# Patient Record
Sex: MALE | Race: WHITE | NOT HISPANIC OR LATINO | Employment: OTHER | URBAN - NONMETROPOLITAN AREA
[De-identification: names, ages, dates, MRNs, and addresses within clinical notes are randomized per-mention and may not be internally consistent; named-entity substitution may affect disease eponyms.]

---

## 2021-09-13 ENCOUNTER — APPOINTMENT (OUTPATIENT)
Dept: CT IMAGING | Facility: HOSPITAL | Age: 61
End: 2021-09-13

## 2021-09-13 ENCOUNTER — HOSPITAL ENCOUNTER (OUTPATIENT)
Facility: HOSPITAL | Age: 61
Setting detail: OBSERVATION
Discharge: HOME | End: 2021-09-15
Attending: EMERGENCY MEDICINE | Admitting: INTERNAL MEDICINE

## 2021-09-13 DIAGNOSIS — I25.10 ATHEROSCLEROSIS OF NATIVE CORONARY ARTERY OF NATIVE HEART WITHOUT ANGINA PECTORIS: ICD-10-CM

## 2021-09-13 DIAGNOSIS — R94.31 ABNORMAL EKG: ICD-10-CM

## 2021-09-13 DIAGNOSIS — R07.9 CHEST PAIN, UNSPECIFIED TYPE: Primary | ICD-10-CM

## 2021-09-13 DIAGNOSIS — I25.810 ATHEROSCLEROSIS OF AUTOLOGOUS VEIN CORONARY ARTERY BYPASS GRAFT, ANGINA PRESENCE UNSPECIFIED: ICD-10-CM

## 2021-09-13 LAB
BASOPHILS # BLD AUTO: 0.04 10*3/MM3 (ref 0–0.2)
BASOPHILS NFR BLD AUTO: 0.5 % (ref 0–1.5)
DEPRECATED RDW RBC AUTO: 38.8 FL (ref 37–54)
EOSINOPHIL # BLD AUTO: 0.31 10*3/MM3 (ref 0–0.4)
EOSINOPHIL NFR BLD AUTO: 4.1 % (ref 0.3–6.2)
ERYTHROCYTE [DISTWIDTH] IN BLOOD BY AUTOMATED COUNT: 13.1 % (ref 12.3–15.4)
HCT VFR BLD AUTO: 43.2 % (ref 37.5–51)
HGB BLD-MCNC: 14.6 G/DL (ref 13–17.7)
IMM GRANULOCYTES # BLD AUTO: 0.04 10*3/MM3 (ref 0–0.05)
IMM GRANULOCYTES NFR BLD AUTO: 0.5 % (ref 0–0.5)
LYMPHOCYTES # BLD AUTO: 2.63 10*3/MM3 (ref 0.7–3.1)
LYMPHOCYTES NFR BLD AUTO: 34.8 % (ref 19.6–45.3)
MCH RBC QN AUTO: 28 PG (ref 26.6–33)
MCHC RBC AUTO-ENTMCNC: 33.8 G/DL (ref 31.5–35.7)
MCV RBC AUTO: 82.9 FL (ref 79–97)
MONOCYTES # BLD AUTO: 0.68 10*3/MM3 (ref 0.1–0.9)
MONOCYTES NFR BLD AUTO: 9 % (ref 5–12)
NEUTROPHILS NFR BLD AUTO: 3.85 10*3/MM3 (ref 1.7–7)
NEUTROPHILS NFR BLD AUTO: 51.1 % (ref 42.7–76)
NRBC BLD AUTO-RTO: 0 /100 WBC (ref 0–0.2)
PLATELET # BLD AUTO: 216 10*3/MM3 (ref 140–450)
PMV BLD AUTO: 10.4 FL (ref 6–12)
RBC # BLD AUTO: 5.21 10*6/MM3 (ref 4.14–5.8)
WBC # BLD AUTO: 7.55 10*3/MM3 (ref 3.4–10.8)

## 2021-09-13 PROCEDURE — 96360 HYDRATION IV INFUSION INIT: CPT

## 2021-09-13 PROCEDURE — 80053 COMPREHEN METABOLIC PANEL: CPT | Performed by: EMERGENCY MEDICINE

## 2021-09-13 PROCEDURE — 93005 ELECTROCARDIOGRAM TRACING: CPT | Performed by: EMERGENCY MEDICINE

## 2021-09-13 PROCEDURE — 99285 EMERGENCY DEPT VISIT HI MDM: CPT

## 2021-09-13 PROCEDURE — 83690 ASSAY OF LIPASE: CPT | Performed by: EMERGENCY MEDICINE

## 2021-09-13 PROCEDURE — 85025 COMPLETE CBC W/AUTO DIFF WBC: CPT | Performed by: EMERGENCY MEDICINE

## 2021-09-13 PROCEDURE — 93005 ELECTROCARDIOGRAM TRACING: CPT

## 2021-09-13 PROCEDURE — 84484 ASSAY OF TROPONIN QUANT: CPT | Performed by: EMERGENCY MEDICINE

## 2021-09-13 PROCEDURE — 93010 ELECTROCARDIOGRAM REPORT: CPT | Performed by: INTERNAL MEDICINE

## 2021-09-13 PROCEDURE — 99284 EMERGENCY DEPT VISIT MOD MDM: CPT

## 2021-09-13 RX ORDER — SODIUM CHLORIDE 0.9 % (FLUSH) 0.9 %
10 SYRINGE (ML) INJECTION AS NEEDED
Status: DISCONTINUED | OUTPATIENT
Start: 2021-09-13 | End: 2021-09-15 | Stop reason: HOSPADM

## 2021-09-13 RX ADMIN — SODIUM CHLORIDE 1000 ML: 900 INJECTION, SOLUTION INTRAVENOUS at 23:18

## 2021-09-13 RX ADMIN — NITROGLYCERIN 1 INCH: 20 OINTMENT TOPICAL at 23:18

## 2021-09-14 ENCOUNTER — APPOINTMENT (OUTPATIENT)
Dept: CARDIOLOGY | Facility: HOSPITAL | Age: 61
End: 2021-09-14

## 2021-09-14 ENCOUNTER — APPOINTMENT (OUTPATIENT)
Dept: NUCLEAR MEDICINE | Facility: HOSPITAL | Age: 61
End: 2021-09-14

## 2021-09-14 ENCOUNTER — APPOINTMENT (OUTPATIENT)
Dept: CT IMAGING | Facility: HOSPITAL | Age: 61
End: 2021-09-14

## 2021-09-14 PROBLEM — R07.9 CHEST PAIN: Status: ACTIVE | Noted: 2021-09-14

## 2021-09-14 LAB
ALBUMIN SERPL-MCNC: 4.3 G/DL (ref 3.5–5.2)
ALBUMIN/GLOB SERPL: 1.8 G/DL
ALP SERPL-CCNC: 86 U/L (ref 39–117)
ALT SERPL W P-5'-P-CCNC: 14 U/L (ref 1–41)
ANION GAP SERPL CALCULATED.3IONS-SCNC: 10 MMOL/L (ref 5–15)
ANION GAP SERPL CALCULATED.3IONS-SCNC: 11 MMOL/L (ref 5–15)
AST SERPL-CCNC: 13 U/L (ref 1–40)
BH CV ECHO MEAS - AI DEC SLOPE: 235 CM/SEC^2
BH CV ECHO MEAS - AI MAX PG: 76.7 MMHG
BH CV ECHO MEAS - AI MAX VEL: 438 CM/SEC
BH CV ECHO MEAS - AI P1/2T: 545.9 MSEC
BH CV ECHO MEAS - AO MAX PG (FULL): 6.5 MMHG
BH CV ECHO MEAS - AO MAX PG: 14.4 MMHG
BH CV ECHO MEAS - AO MEAN PG (FULL): 4 MMHG
BH CV ECHO MEAS - AO MEAN PG: 8 MMHG
BH CV ECHO MEAS - AO V2 MAX: 190 CM/SEC
BH CV ECHO MEAS - AO V2 MEAN: 131 CM/SEC
BH CV ECHO MEAS - AO V2 VTI: 43.9 CM
BH CV ECHO MEAS - AVA(I,A): 2.9 CM^2
BH CV ECHO MEAS - AVA(I,D): 2.9 CM^2
BH CV ECHO MEAS - AVA(V,A): 2.8 CM^2
BH CV ECHO MEAS - AVA(V,D): 2.8 CM^2
BH CV ECHO MEAS - BSA(HAYCOCK): 2.2 M^2
BH CV ECHO MEAS - BSA: 2.1 M^2
BH CV ECHO MEAS - BZI_BMI: 29.4 KILOGRAMS/M^2
BH CV ECHO MEAS - BZI_METRIC_HEIGHT: 177.8 CM
BH CV ECHO MEAS - BZI_METRIC_WEIGHT: 93 KG
BH CV ECHO MEAS - EDV(CUBED): 85.2 ML
BH CV ECHO MEAS - EDV(MOD-SP2): 67.3 ML
BH CV ECHO MEAS - EDV(MOD-SP4): 62.1 ML
BH CV ECHO MEAS - EDV(TEICH): 87.7 ML
BH CV ECHO MEAS - EF(CUBED): 71.4 %
BH CV ECHO MEAS - EF(MOD-SP2): 61.7 %
BH CV ECHO MEAS - EF(MOD-SP4): 64.6 %
BH CV ECHO MEAS - EF(TEICH): 63.3 %
BH CV ECHO MEAS - ESV(CUBED): 24.4 ML
BH CV ECHO MEAS - ESV(MOD-SP2): 25.8 ML
BH CV ECHO MEAS - ESV(MOD-SP4): 22 ML
BH CV ECHO MEAS - ESV(TEICH): 32.2 ML
BH CV ECHO MEAS - FS: 34.1 %
BH CV ECHO MEAS - IVS/LVPW: 1.1
BH CV ECHO MEAS - IVSD: 1.1 CM
BH CV ECHO MEAS - LA DIMENSION: 3.6 CM
BH CV ECHO MEAS - LV DIASTOLIC VOL/BSA (35-75): 29.4 ML/M^2
BH CV ECHO MEAS - LV MASS(C)D: 163.5 GRAMS
BH CV ECHO MEAS - LV MASS(C)DI: 77.5 GRAMS/M^2
BH CV ECHO MEAS - LV MAX PG: 8 MMHG
BH CV ECHO MEAS - LV MEAN PG: 4 MMHG
BH CV ECHO MEAS - LV SYSTOLIC VOL/BSA (12-30): 10.4 ML/M^2
BH CV ECHO MEAS - LV V1 MAX: 141 CM/SEC
BH CV ECHO MEAS - LV V1 MEAN: 98.7 CM/SEC
BH CV ECHO MEAS - LV V1 VTI: 33.3 CM
BH CV ECHO MEAS - LVIDD: 4.4 CM
BH CV ECHO MEAS - LVIDS: 2.9 CM
BH CV ECHO MEAS - LVLD AP2: 7.4 CM
BH CV ECHO MEAS - LVLD AP4: 8.1 CM
BH CV ECHO MEAS - LVLS AP2: 6.3 CM
BH CV ECHO MEAS - LVLS AP4: 6.2 CM
BH CV ECHO MEAS - LVOT AREA (M): 3.8 CM^2
BH CV ECHO MEAS - LVOT AREA: 3.8 CM^2
BH CV ECHO MEAS - LVOT DIAM: 2.2 CM
BH CV ECHO MEAS - LVPWD: 1 CM
BH CV ECHO MEAS - MR MAX PG: 88.7 MMHG
BH CV ECHO MEAS - MR MAX VEL: 471 CM/SEC
BH CV ECHO MEAS - MV A MAX VEL: 75.8 CM/SEC
BH CV ECHO MEAS - MV DEC SLOPE: 502 CM/SEC^2
BH CV ECHO MEAS - MV E MAX VEL: 99.4 CM/SEC
BH CV ECHO MEAS - MV E/A: 1.3
BH CV ECHO MEAS - MV P1/2T MAX VEL: 103 CM/SEC
BH CV ECHO MEAS - MV P1/2T: 60.1 MSEC
BH CV ECHO MEAS - MVA P1/2T LCG: 2.1 CM^2
BH CV ECHO MEAS - MVA(P1/2T): 3.7 CM^2
BH CV ECHO MEAS - PA MAX PG (FULL): 5.2 MMHG
BH CV ECHO MEAS - PA MAX PG: 7.3 MMHG
BH CV ECHO MEAS - PA V2 MAX: 135 CM/SEC
BH CV ECHO MEAS - RAP SYSTOLE: 5 MMHG
BH CV ECHO MEAS - RV MAX PG: 2.1 MMHG
BH CV ECHO MEAS - RV MEAN PG: 1 MMHG
BH CV ECHO MEAS - RV V1 MAX: 73.1 CM/SEC
BH CV ECHO MEAS - RV V1 MEAN: 50.1 CM/SEC
BH CV ECHO MEAS - RV V1 VTI: 18.5 CM
BH CV ECHO MEAS - RVDD: 2.7 CM
BH CV ECHO MEAS - RVSP: 39.6 MMHG
BH CV ECHO MEAS - SI(CUBED): 28.8 ML/M^2
BH CV ECHO MEAS - SI(LVOT): 60 ML/M^2
BH CV ECHO MEAS - SI(MOD-SP2): 19.7 ML/M^2
BH CV ECHO MEAS - SI(MOD-SP4): 19 ML/M^2
BH CV ECHO MEAS - SI(TEICH): 26.3 ML/M^2
BH CV ECHO MEAS - SV(CUBED): 60.8 ML
BH CV ECHO MEAS - SV(LVOT): 126.6 ML
BH CV ECHO MEAS - SV(MOD-SP2): 41.5 ML
BH CV ECHO MEAS - SV(MOD-SP4): 40.1 ML
BH CV ECHO MEAS - SV(TEICH): 55.5 ML
BH CV ECHO MEAS - TR MAX VEL: 294 CM/SEC
BH CV REST NUCLEAR ISOTOPE DOSE: 10.8 MCI
BH CV STRESS BP STAGE 1: NORMAL
BH CV STRESS DURATION MIN STAGE 1: 3
BH CV STRESS DURATION SEC STAGE 1: 0
BH CV STRESS GRADE STAGE 1: 10
BH CV STRESS HR STAGE 1: 114
BH CV STRESS METS STAGE 1: 5
BH CV STRESS NUCLEAR ISOTOPE DOSE: 33.3 MCI
BH CV STRESS PROTOCOL 1: NORMAL
BH CV STRESS PROTOCOL 2 BP STAGE 1: NORMAL
BH CV STRESS PROTOCOL 2 COMMENTS STAGE 1: NORMAL
BH CV STRESS PROTOCOL 2 DOSE REGADENOSON STAGE 1: 0.4
BH CV STRESS PROTOCOL 2 DURATION MIN STAGE 1: 0
BH CV STRESS PROTOCOL 2 DURATION SEC STAGE 1: 10
BH CV STRESS PROTOCOL 2 HR STAGE 1: 101
BH CV STRESS PROTOCOL 2 STAGE 1: 1
BH CV STRESS PROTOCOL 2: NORMAL
BH CV STRESS RECOVERY BP: NORMAL MMHG
BH CV STRESS RECOVERY HR: 97 BPM
BH CV STRESS SPEED STAGE 1: 1.7
BH CV STRESS STAGE 1: 1
BILIRUB SERPL-MCNC: 0.3 MG/DL (ref 0–1.2)
BUN SERPL-MCNC: 12 MG/DL (ref 6–20)
BUN SERPL-MCNC: 12 MG/DL (ref 6–20)
BUN/CREAT SERPL: 15.2 (ref 7–25)
BUN/CREAT SERPL: 18.2 (ref 7–25)
CALCIUM SPEC-SCNC: 8.8 MG/DL (ref 8.6–10.5)
CALCIUM SPEC-SCNC: 9.6 MG/DL (ref 8.6–10.5)
CHLORIDE SERPL-SCNC: 106 MMOL/L (ref 98–107)
CHLORIDE SERPL-SCNC: 99 MMOL/L (ref 98–107)
CO2 SERPL-SCNC: 24 MMOL/L (ref 22–29)
CO2 SERPL-SCNC: 24 MMOL/L (ref 22–29)
CREAT SERPL-MCNC: 0.66 MG/DL (ref 0.76–1.27)
CREAT SERPL-MCNC: 0.79 MG/DL (ref 0.76–1.27)
DEPRECATED RDW RBC AUTO: 38.7 FL (ref 37–54)
ERYTHROCYTE [DISTWIDTH] IN BLOOD BY AUTOMATED COUNT: 13 % (ref 12.3–15.4)
FLUAV RNA RESP QL NAA+PROBE: NOT DETECTED
FLUBV RNA RESP QL NAA+PROBE: NOT DETECTED
GFR SERPL CREATININE-BSD FRML MDRD: 100 ML/MIN/1.73
GFR SERPL CREATININE-BSD FRML MDRD: 124 ML/MIN/1.73
GLOBULIN UR ELPH-MCNC: 2.4 GM/DL
GLUCOSE BLDC GLUCOMTR-MCNC: 201 MG/DL (ref 70–130)
GLUCOSE BLDC GLUCOMTR-MCNC: 238 MG/DL (ref 70–130)
GLUCOSE SERPL-MCNC: 152 MG/DL (ref 65–99)
GLUCOSE SERPL-MCNC: 200 MG/DL (ref 65–99)
HCT VFR BLD AUTO: 41.4 % (ref 37.5–51)
HGB BLD-MCNC: 14.3 G/DL (ref 13–17.7)
HOLD SPECIMEN: NORMAL
LIPASE SERPL-CCNC: 29 U/L (ref 13–60)
LV EF NUC BP: 77 %
MAXIMAL PREDICTED HEART RATE: 161 BPM
MAXIMAL PREDICTED HEART RATE: 161 BPM
MCH RBC QN AUTO: 28.5 PG (ref 26.6–33)
MCHC RBC AUTO-ENTMCNC: 34.5 G/DL (ref 31.5–35.7)
MCV RBC AUTO: 82.6 FL (ref 79–97)
PERCENT MAX PREDICTED HR: 78.26 %
PLATELET # BLD AUTO: 183 10*3/MM3 (ref 140–450)
PMV BLD AUTO: 9.7 FL (ref 6–12)
POTASSIUM SERPL-SCNC: 3.5 MMOL/L (ref 3.5–5.2)
POTASSIUM SERPL-SCNC: 3.7 MMOL/L (ref 3.5–5.2)
PROT SERPL-MCNC: 6.7 G/DL (ref 6–8.5)
RBC # BLD AUTO: 5.01 10*6/MM3 (ref 4.14–5.8)
SARS-COV-2 RNA RESP QL NAA+PROBE: NOT DETECTED
SODIUM SERPL-SCNC: 134 MMOL/L (ref 136–145)
SODIUM SERPL-SCNC: 140 MMOL/L (ref 136–145)
STRESS BASELINE BP: NORMAL MMHG
STRESS BASELINE HR: 58 BPM
STRESS PERCENT HR: 92 %
STRESS POST EXERCISE DUR MIN: 2 MIN
STRESS POST EXERCISE DUR SEC: 9 SEC
STRESS POST PEAK BP: NORMAL MMHG
STRESS POST PEAK HR: 126 BPM
STRESS TARGET HR: 137 BPM
STRESS TARGET HR: 137 BPM
TROPONIN T SERPL-MCNC: <0.01 NG/ML (ref 0–0.03)
WBC # BLD AUTO: 7.19 10*3/MM3 (ref 3.4–10.8)
WHOLE BLOOD HOLD SPECIMEN: NORMAL

## 2021-09-14 PROCEDURE — 87636 SARSCOV2 & INF A&B AMP PRB: CPT

## 2021-09-14 PROCEDURE — A9500 TC99M SESTAMIBI: HCPCS | Performed by: INTERNAL MEDICINE

## 2021-09-14 PROCEDURE — G0378 HOSPITAL OBSERVATION PER HR: HCPCS

## 2021-09-14 PROCEDURE — 84484 ASSAY OF TROPONIN QUANT: CPT | Performed by: EMERGENCY MEDICINE

## 2021-09-14 PROCEDURE — 93005 ELECTROCARDIOGRAM TRACING: CPT | Performed by: INTERNAL MEDICINE

## 2021-09-14 PROCEDURE — 93306 TTE W/DOPPLER COMPLETE: CPT

## 2021-09-14 PROCEDURE — 25010000002 REGADENOSON 0.4 MG/5ML SOLUTION: Performed by: INTERNAL MEDICINE

## 2021-09-14 PROCEDURE — 80048 BASIC METABOLIC PNL TOTAL CA: CPT | Performed by: INTERNAL MEDICINE

## 2021-09-14 PROCEDURE — 0 TECHNETIUM SESTAMIBI: Performed by: INTERNAL MEDICINE

## 2021-09-14 PROCEDURE — 85027 COMPLETE CBC AUTOMATED: CPT | Performed by: INTERNAL MEDICINE

## 2021-09-14 PROCEDURE — 71275 CT ANGIOGRAPHY CHEST: CPT

## 2021-09-14 PROCEDURE — 93010 ELECTROCARDIOGRAM REPORT: CPT | Performed by: INTERNAL MEDICINE

## 2021-09-14 PROCEDURE — 78452 HT MUSCLE IMAGE SPECT MULT: CPT

## 2021-09-14 PROCEDURE — 93017 CV STRESS TEST TRACING ONLY: CPT

## 2021-09-14 PROCEDURE — 96361 HYDRATE IV INFUSION ADD-ON: CPT

## 2021-09-14 PROCEDURE — 36415 COLL VENOUS BLD VENIPUNCTURE: CPT | Performed by: INTERNAL MEDICINE

## 2021-09-14 PROCEDURE — 84484 ASSAY OF TROPONIN QUANT: CPT | Performed by: INTERNAL MEDICINE

## 2021-09-14 PROCEDURE — 63710000001 INSULIN ASPART PER 5 UNITS: Performed by: NURSE PRACTITIONER

## 2021-09-14 PROCEDURE — 74174 CTA ABD&PLVS W/CONTRAST: CPT

## 2021-09-14 PROCEDURE — 82962 GLUCOSE BLOOD TEST: CPT

## 2021-09-14 PROCEDURE — 0 IOPAMIDOL PER 1 ML: Performed by: EMERGENCY MEDICINE

## 2021-09-14 RX ORDER — ESCITALOPRAM OXALATE 5 MG/1
5 TABLET ORAL DAILY
COMMUNITY

## 2021-09-14 RX ORDER — GLIPIZIDE 10 MG/1
10 TABLET ORAL
COMMUNITY

## 2021-09-14 RX ORDER — DEXTROSE MONOHYDRATE 25 G/50ML
25 INJECTION, SOLUTION INTRAVENOUS
Status: DISCONTINUED | OUTPATIENT
Start: 2021-09-14 | End: 2021-09-15 | Stop reason: HOSPADM

## 2021-09-14 RX ORDER — AMOXICILLIN 250 MG
2 CAPSULE ORAL 2 TIMES DAILY
Status: DISCONTINUED | OUTPATIENT
Start: 2021-09-14 | End: 2021-09-15 | Stop reason: HOSPADM

## 2021-09-14 RX ORDER — SODIUM CHLORIDE 0.9 % (FLUSH) 0.9 %
10 SYRINGE (ML) INJECTION EVERY 12 HOURS SCHEDULED
Status: DISCONTINUED | OUTPATIENT
Start: 2021-09-14 | End: 2021-09-15 | Stop reason: HOSPADM

## 2021-09-14 RX ORDER — ATORVASTATIN CALCIUM 40 MG/1
40 TABLET, FILM COATED ORAL DAILY
COMMUNITY

## 2021-09-14 RX ORDER — ASPIRIN 81 MG/1
81 TABLET, CHEWABLE ORAL DAILY
COMMUNITY

## 2021-09-14 RX ORDER — SODIUM CHLORIDE 0.9 % (FLUSH) 0.9 %
10 SYRINGE (ML) INJECTION AS NEEDED
Status: DISCONTINUED | OUTPATIENT
Start: 2021-09-14 | End: 2021-09-15 | Stop reason: HOSPADM

## 2021-09-14 RX ORDER — CALCIUM CARBONATE 200(500)MG
1 TABLET,CHEWABLE ORAL 2 TIMES DAILY PRN
Status: DISCONTINUED | OUTPATIENT
Start: 2021-09-14 | End: 2021-09-15 | Stop reason: HOSPADM

## 2021-09-14 RX ORDER — BISACODYL 5 MG/1
5 TABLET, DELAYED RELEASE ORAL DAILY PRN
Status: DISCONTINUED | OUTPATIENT
Start: 2021-09-14 | End: 2021-09-15 | Stop reason: HOSPADM

## 2021-09-14 RX ORDER — BISACODYL 10 MG
10 SUPPOSITORY, RECTAL RECTAL DAILY PRN
Status: DISCONTINUED | OUTPATIENT
Start: 2021-09-14 | End: 2021-09-15 | Stop reason: HOSPADM

## 2021-09-14 RX ORDER — FAMOTIDINE 20 MG/1
20 TABLET, FILM COATED ORAL 2 TIMES DAILY
COMMUNITY

## 2021-09-14 RX ORDER — CETIRIZINE HYDROCHLORIDE 10 MG/1
10 TABLET ORAL DAILY
COMMUNITY

## 2021-09-14 RX ORDER — POLYETHYLENE GLYCOL 3350 17 G/17G
17 POWDER, FOR SOLUTION ORAL DAILY PRN
Status: DISCONTINUED | OUTPATIENT
Start: 2021-09-14 | End: 2021-09-15 | Stop reason: HOSPADM

## 2021-09-14 RX ORDER — ONDANSETRON 2 MG/ML
4 INJECTION INTRAMUSCULAR; INTRAVENOUS EVERY 6 HOURS PRN
Status: DISCONTINUED | OUTPATIENT
Start: 2021-09-14 | End: 2021-09-15 | Stop reason: HOSPADM

## 2021-09-14 RX ORDER — LOSARTAN POTASSIUM 50 MG/1
100 TABLET ORAL DAILY
COMMUNITY

## 2021-09-14 RX ORDER — SODIUM CHLORIDE 0.9 % (FLUSH) 0.9 %
10 SYRINGE (ML) INJECTION ONCE
Status: COMPLETED | OUTPATIENT
Start: 2021-09-14 | End: 2021-09-14

## 2021-09-14 RX ORDER — NITROGLYCERIN 0.4 MG/1
0.4 TABLET SUBLINGUAL
Status: DISCONTINUED | OUTPATIENT
Start: 2021-09-14 | End: 2021-09-15 | Stop reason: HOSPADM

## 2021-09-14 RX ORDER — NICOTINE POLACRILEX 4 MG
15 LOZENGE BUCCAL
Status: DISCONTINUED | OUTPATIENT
Start: 2021-09-14 | End: 2021-09-15 | Stop reason: HOSPADM

## 2021-09-14 RX ORDER — ASPIRIN 81 MG/1
81 TABLET ORAL DAILY
Status: DISCONTINUED | OUTPATIENT
Start: 2021-09-15 | End: 2021-09-15 | Stop reason: HOSPADM

## 2021-09-14 RX ORDER — ASPIRIN 81 MG/1
81 TABLET, CHEWABLE ORAL ONCE
Status: COMPLETED | OUTPATIENT
Start: 2021-09-14 | End: 2021-09-14

## 2021-09-14 RX ORDER — DIPHENHYDRAMINE HCL 25 MG
25 CAPSULE ORAL EVERY 6 HOURS PRN
Status: DISCONTINUED | OUTPATIENT
Start: 2021-09-14 | End: 2021-09-15 | Stop reason: HOSPADM

## 2021-09-14 RX ORDER — DAPAGLIFLOZIN 10 MG/1
10 TABLET, FILM COATED ORAL
COMMUNITY

## 2021-09-14 RX ADMIN — Medication 10 ML: at 02:49

## 2021-09-14 RX ADMIN — TECHNETIUM TC 99M SESTAMIBI 1 DOSE: 1 INJECTION INTRAVENOUS at 08:40

## 2021-09-14 RX ADMIN — TECHNETIUM TC 99M SESTAMIBI 1 DOSE: 1 INJECTION INTRAVENOUS at 10:17

## 2021-09-14 RX ADMIN — IOPAMIDOL 90 ML: 755 INJECTION, SOLUTION INTRAVENOUS at 00:53

## 2021-09-14 RX ADMIN — REGADENOSON 0.4 MG: 0.08 INJECTION, SOLUTION INTRAVENOUS at 10:16

## 2021-09-14 RX ADMIN — ASPIRIN 81 MG: 81 TABLET, CHEWABLE ORAL at 04:46

## 2021-09-14 RX ADMIN — INSULIN ASPART 4 UNITS: 100 INJECTION, SOLUTION INTRAVENOUS; SUBCUTANEOUS at 18:16

## 2021-09-14 RX ADMIN — SODIUM CHLORIDE, PRESERVATIVE FREE 10 ML: 5 INJECTION INTRAVENOUS at 21:35

## 2021-09-14 RX ADMIN — SODIUM CHLORIDE, PRESERVATIVE FREE 10 ML: 5 INJECTION INTRAVENOUS at 10:16

## 2021-09-14 NOTE — ED NOTES
Hourly rounding complete. Pt resting comfortably in bed. Wife at the bedside.      Karen Gordon, RN  09/14/21 7707

## 2021-09-14 NOTE — ED NOTES
Pt back in his room and connected back to monitors. Provided with fresh ice water.      Karen Gordon, RN  09/14/21 [id number]

## 2021-09-14 NOTE — PROGRESS NOTES
9/14 0740    Risks and benefits of stress test d/w patient. He is in agreement that potential benefits exceed risks and wishes to proceed with stress test.

## 2021-09-14 NOTE — NURSING NOTE
While on treadmill for stress test, patient stated he did not think he could keep going. He said he felt indigestion and heartburn. Treadmill stopped patient got onto stretcher. Patient began vomiting. After vomiting resolved, he said he wanted to proceed with doing Lexiscan. After Lexiscan given, patient began vomiting again. Before patient taken back to ER, he stated he was feeling better but did still have a headache. Recieving RN notified

## 2021-09-14 NOTE — H&P
[facility] Medicine Admission      Date of Admission: 9/13/2021      Primary Care Physician: Renita Valdes APRN      Chief Complaint: chest pain    HPI:  61M h/o CABG presents with chest pain x 2d. Pt has h/o 5 vessel CABG in 2016. He has felt what he describes as a moderate achy sensation in his mid chest over the last couple of days. He presumed he'd pulled a muscle since he'd been moving around some wood at his house over the weekend. However pain is not consistently reproduced with any movement or activity. Pain is centered in midchest and radiates to back. Sx severity 5/10. Main episode lasted for a couple of hours. He felt some milder pain upon registration in ER. He is presently asymptomatic. This episode is NOT reminiscent of circumstances surrounding his CABG, which mainly manifested as SOB.     Initial trop NEG.  EKG: NSR, lat T inversions, but no baseline comparison available  Cards: Danielle, but has not seen in about 5 years    Concurrent Medical History:   CAD    Past Surgical History:   CABG x 5 in 2016    Family History: family history is not on file. noncontribuotory    Social History:     ; lives with wife  ID with ADLs at baseline    Allergies: No Known Allergies    Medications:   Prior to Admission medications    Not on File       Review of Systems:  Chest pain   Otherwise complete ROS is negative except as mentioned above.    Physical Exam:   Temp:  [98.1 °F (36.7 °C)] 98.1 °F (36.7 °C)  Heart Rate:  [66-91] 83  Resp:  [18-20] 18  BP: (118-165)/(64-97) 118/64  GEN: nad; a&o x3  HEENT: ncat; perrl  NECK: supple  Neuro: 5/5 str; acutely nonfocal  Skin: no gen rash  CVS: s1s2  Chest: CTA bilat; EBBS  ABD: soft nt/nd; +BS  EXT: no c/c/e; no acute deformity      Results Reviewed:  I have personally reviewed current lab, radiology, and data and agree with results.  Lab Results (last 24 hours)     Procedure Component Value Units Date/Time    Extra  Tubes [[id number]] Collected: 09/13/21 2323    Specimen: Blood, Venous Line Updated: 09/14/21 0030    Narrative:      The following orders were created for panel order Extra Tubes.  Procedure                               Abnormality         Status                     ---------                               -----------         ------                     Gold Top - SST[[id number]]                                   Final result               Light Blue Top[[id number]]                                   Final result                 Please view results for these tests on the individual orders.    Light Blue Top [[id number]] Collected: 09/13/21 2323    Specimen: Blood Updated: 09/14/21 0030     Extra Tube hold for add-on     Comment: Auto resulted       Gold Top - SST [[id number]] Collected: 09/13/21 2323    Specimen: Blood Updated: 09/14/21 0030     Extra Tube Hold for add-ons.     Comment: Auto resulted.       Troponin [[id number]]  (Normal) Collected: 09/13/21 2317    Specimen: Blood Updated: 09/14/21 0014     Troponin T <0.010 ng/mL     Narrative:      Troponin T Reference Range:  <= 0.03 ng/mL-   Negative for AMI  >0.03 ng/mL-     Abnormal for myocardial necrosis.  Clinicians would have to utilize clinical acumen, EKG, Troponin and serial changes to determine if it is an Acute Myocardial Infarction or myocardial injury due to an underlying chronic condition.       Results may be falsely decreased if patient taking Biotin.      Comprehensive Metabolic Panel [[id number]]  (Abnormal) Collected: 09/13/21 2317    Specimen: Blood Updated: 09/14/21 0005     Glucose 200 mg/dL      BUN 12 mg/dL      Creatinine 0.79 mg/dL      Sodium 134 mmol/L      Potassium 3.5 mmol/L      Chloride 99 mmol/L      CO2 24.0 mmol/L      Calcium 9.6 mg/dL      Total Protein 6.7 g/dL      Albumin 4.30 g/dL      ALT (SGPT) 14 U/L      AST (SGOT) 13 U/L      Alkaline Phosphatase 86 U/L      Total Bilirubin 0.3 mg/dL      eGFR Non African Amer 100  mL/min/1.73      Globulin 2.4 gm/dL      A/G Ratio 1.8 g/dL      BUN/Creatinine Ratio 15.2     Anion Gap 11.0 mmol/L     Narrative:      GFR Normal >60  Chronic Kidney Disease <60  Kidney Failure <15      Lipase [[id number]]  (Normal) Collected: 09/13/21 2317    Specimen: Blood Updated: 09/14/21 0005     Lipase 29 U/L     CBC & Differential [[id number]]  (Normal) Collected: 09/13/21 2317    Specimen: Blood Updated: 09/13/21 2332    Narrative:      The following orders were created for panel order CBC & Differential.  Procedure                               Abnormality         Status                     ---------                               -----------         ------                     CBC Auto Differential[[id number]]        Normal              Final result                 Please view results for these tests on the individual orders.    CBC Auto Differential [[id number]]  (Normal) Collected: 09/13/21 2317    Specimen: Blood Updated: 09/13/21 2332     WBC 7.55 10*3/mm3      RBC 5.21 10*6/mm3      Hemoglobin 14.6 g/dL      Hematocrit 43.2 %      MCV 82.9 fL      MCH 28.0 pg      MCHC 33.8 g/dL      RDW 13.1 %      RDW-SD 38.8 fl      MPV 10.4 fL      Platelets 216 10*3/mm3      Neutrophil % 51.1 %      Lymphocyte % 34.8 %      Monocyte % 9.0 %      Eosinophil % 4.1 %      Basophil % 0.5 %      Immature Grans % 0.5 %      Neutrophils, Absolute 3.85 10*3/mm3      Lymphocytes, Absolute 2.63 10*3/mm3      Monocytes, Absolute 0.68 10*3/mm3      Eosinophils, Absolute 0.31 10*3/mm3      Basophils, Absolute 0.04 10*3/mm3      Immature Grans, Absolute 0.04 10*3/mm3      nRBC 0.0 /100 WBC         Imaging Results (Last 24 Hours)     Procedure Component Value Units Date/Time    CT Angiogram Chest [[id number]] Collected: 09/14/21 0050     Updated: 09/14/21 0145    Narrative:      EXAM DESCRIPTION:  CT ANGIOGRAM CHEST (accession [id number]), CT ANGIOGRAM ABDOMEN  PELVIS (accession [id number])  [facility]: CT CHEST ANGIOGRAPHY WITH IV  CONTRAST, CT ABDOMEN PELVIS  ANGIOGRAPHY WITHOUT THEN WITH IV CONTRAST     CLINICAL HISTORY:   61 years  Male;  Chest pain radiating to back, rule out aortic  dissection    TECHNOLOGIST NOTES:    COMPARISON: None currently available    TECHNIQUE:     Helical CT performed after bolus intravenous contrast. There are  axial, sagittal and coronal reformatted images available for  review. This exam was performed according to our departmental  dose-optimization program, which includes automated exposure  control, adjustment of the mA and/or kV according to patient size  and/or use of iterative reconstruction technique.  This  examination was performed according to a CT angiographic (CTA)  protocol with 3D post-processing. This involves 3D  reconstructions, MIPS, volume rendered images and/or shaded  surface rendering.  The patient was injected with bolus IV contrast.    FINDINGS:     CTA chest  There is adequate opacification of the pulmonary arteries. No  filling defects to suggest an acute pulmonary embolism. Aorta is  unremarkable. No evidence of significant aneurysmal enlargement  or dissection of the thoracic aorta. There has been prior CABG.    There is no focal area of consolidation.  . No interstitial  disease. No pulmonary fibrosis. No honeycombing. No pneumothorax.  There is no pleural effusion. No pericardial effusion. Calcified  granulomatous disease incidentally noted.    Abdomen:  The liver, spleen, pancreas, adrenal glands and kidneys show no  acute abnormality  . No evidence of acute pancreatitis.    There  are no calcified gallstones. There is no gallbladder wall  thickening. No pericholecystic fluid.  There is no gross biliary  duct dilatation.  No significant hydronephrosis bilaterally.      No free air.    There is no significant free fluid.    There is  no significant lymph node enlargement. There is no significant  aneurysmal enlargement of the abdominal aorta. No evidence  of  dissection.    Pelvis:  There is no evidence of bowel obstruction.    No herniated bowel  loops.  . No focal inflammatory changes . Colonic diverticula but  no evidence of acute diverticulitis.  Evaluation of the bowel is  limited when there is no oral contrast or when the bowel is  incompletely opacified with enteric contrast.. There is no  significant free fluid in the pelvis. Bladder is grossly  unremarkable.          Impression:        1.  Aorta is unremarkable. No evidence of aneurysm or dissection.  2.  No pulmonary embolism. No acute cardiopulmonary disease.  3.  No bowel obstruction. No herniated bowel loops. No focal  inflammatory changes.    Electronically signed by:  Denver Burks MD  9/14/2021 1:44 AM CDT  Workstation: [id number]    CT Angiogram Abdomen Pelvis [[id number]] Collected: 09/14/21 0050     Updated: 09/14/21 0145    Narrative:      EXAM DESCRIPTION:  CT ANGIOGRAM CHEST (accession [id number]), CT ANGIOGRAM ABDOMEN  PELVIS (accession [id number])  [facility]: CT CHEST ANGIOGRAPHY WITH IV CONTRAST, CT ABDOMEN PELVIS  ANGIOGRAPHY WITHOUT THEN WITH IV CONTRAST     CLINICAL HISTORY:   61 years  Male;  Chest pain radiating to back, rule out aortic  dissection    TECHNOLOGIST NOTES:    COMPARISON: None currently available    TECHNIQUE:     Helical CT performed after bolus intravenous contrast. There are  axial, sagittal and coronal reformatted images available for  review. This exam was performed according to our departmental  dose-optimization program, which includes automated exposure  control, adjustment of the mA and/or kV according to patient size  and/or use of iterative reconstruction technique.  This  examination was performed according to a CT angiographic (CTA)  protocol with 3D post-processing. This involves 3D  reconstructions, MIPS, volume rendered images and/or shaded  surface rendering.  The patient was injected with bolus IV contrast.    FINDINGS:     CTA chest  There is adequate  opacification of the pulmonary arteries. No  filling defects to suggest an acute pulmonary embolism. Aorta is  unremarkable. No evidence of significant aneurysmal enlargement  or dissection of the thoracic aorta. There has been prior CABG.    There is no focal area of consolidation.  . No interstitial  disease. No pulmonary fibrosis. No honeycombing. No pneumothorax.  There is no pleural effusion. No pericardial effusion. Calcified  granulomatous disease incidentally noted.    Abdomen:  The liver, spleen, pancreas, adrenal glands and kidneys show no  acute abnormality  . No evidence of acute pancreatitis.    There  are no calcified gallstones. There is no gallbladder wall  thickening. No pericholecystic fluid.  There is no gross biliary  duct dilatation.  No significant hydronephrosis bilaterally.      No free air.    There is no significant free fluid.    There is  no significant lymph node enlargement. There is no significant  aneurysmal enlargement of the abdominal aorta. No evidence of  dissection.    Pelvis:  There is no evidence of bowel obstruction.    No herniated bowel  loops.  . No focal inflammatory changes . Colonic diverticula but  no evidence of acute diverticulitis.  Evaluation of the bowel is  limited when there is no oral contrast or when the bowel is  incompletely opacified with enteric contrast.. There is no  significant free fluid in the pelvis. Bladder is grossly  unremarkable.          Impression:        1.  Aorta is unremarkable. No evidence of aneurysm or dissection.  2.  No pulmonary embolism. No acute cardiopulmonary disease.  3.  No bowel obstruction. No herniated bowel loops. No focal  inflammatory changes.    Electronically signed by:  Denver Burks MD  9/14/2021 1:44 AM CDT  Workstation: [id number]            Assessment:  61M  Active Hospital Problems    Diagnosis    • Chest pain    h/o CAD s/p CABG x5 in 2016          Plan:  Admit to   Serial trop  Stress test in AM  TTE in AM  SLNG if  needed  No home meds reported yet, will reconcile when available  supp care  Follow labs    obs status  Full code  Thank you for this admission      I discussed the patient's findings and my recommendations with: Patient, family    Gaurav Camp Jr, MD

## 2021-09-14 NOTE — PROGRESS NOTES
[facility]  INPATIENT PROGRESS NOTE    Length of Stay: 0  Date of Admission: 9/13/2021  Primary Care Physician: Renita Valdes APRN    Subjective   Chief Complaint:     HPI:      9/14/2021: Patient had two episodes of vomiting during his stress test.      H&P by Dr. Camp:  61M h/o CABG presents with chest pain x 2d. Pt has h/o 5 vessel CABG in 2016. He has felt what he describes as a moderate achy sensation in his mid chest over the last couple of days. He presumed he'd pulled a muscle since he'd been moving around some wood at his house over the weekend. However pain is not consistently reproduced with any movement or activity. Pain is centered in midchest and radiates to back. Sx severity 5/10. Main episode lasted for a couple of hours. He felt some milder pain upon registration in ER. He is presently asymptomatic. This episode is NOT reminiscent of circumstances surrounding his CABG, which mainly manifested as SOB.     Review of Systems   Constitutional: Negative for activity change and fatigue.   HENT: Negative for ear pain and sore throat.    Eyes: Negative for pain and discharge.   Respiratory: Negative for cough and shortness of breath.    Cardiovascular: Negative for chest pain and palpitations.   Gastrointestinal: Negative for abdominal pain and nausea.   Endocrine: Negative for cold intolerance and heat intolerance.   Genitourinary: Negative for difficulty urinating and dysuria.   Musculoskeletal: Negative for arthralgias and gait problem.   Skin: Negative for color change and rash.   Neurological: Negative for dizziness and weakness.   Psychiatric/Behavioral: Negative for agitation and confusion.        Objective    Temp:  [96.5 °F (35.8 °C)-98.1 °F (36.7 °C)] 96.5 °F (35.8 °C)  Heart Rate:  [66-94] 94  Resp:  [18-20] 18  BP: (118-175)/(64-97) 175/84    Physical Exam  Constitutional:       Appearance: He is well-developed.   HENT:       Head: Normocephalic and atraumatic.   Eyes:      Pupils: Pupils are equal, round, and reactive to light.   Cardiovascular:      Rate and Rhythm: Normal rate and regular rhythm.   Pulmonary:      Effort: Pulmonary effort is normal.      Breath sounds: Normal breath sounds.   Abdominal:      General: Bowel sounds are normal.      Palpations: Abdomen is soft.   Musculoskeletal:         General: Normal range of motion.      Cervical back: Normal range of motion and neck supple.   Skin:     General: Skin is warm and dry.   Neurological:      Mental Status: He is alert and oriented to person, place, and time.   Psychiatric:         Behavior: Behavior normal.       Results Review:  I have reviewed the labs, radiology results, and diagnostic studies.    Laboratory Data:   Results from last 7 days   Lab Units 09/14/21  0545 09/13/21  2317   SODIUM mmol/L 140 134*   POTASSIUM mmol/L 3.7 3.5   CHLORIDE mmol/L 106 99   CO2 mmol/L 24.0 24.0   BUN mg/dL 12 12   CREATININE mg/dL 0.66* 0.79   GLUCOSE mg/dL 152* 200*   CALCIUM mg/dL 8.8 9.6   BILIRUBIN mg/dL  --  0.3   ALK PHOS U/L  --  86   ALT (SGPT) U/L  --  14   AST (SGOT) U/L  --  13   ANION GAP mmol/L 10.0 11.0     Estimated Creatinine Clearance: 138.1 mL/min (A) (by C-G formula based on SCr of 0.66 mg/dL (L)).          Results from last 7 days   Lab Units 09/14/21  0545 09/13/21  2317   WBC 10*3/mm3 7.19 7.55   HEMOGLOBIN g/dL 14.3 14.6   HEMATOCRIT % 41.4 43.2   PLATELETS 10*3/mm3 183 216           Culture Data:   No results found for: BLOODCX  No results found for: URINECX  No results found for: RESPCX  No results found for: WOUNDCX  No results found for: STOOLCX  No components found for: BODYFLD    Radiology Data:   Imaging Results (Last 24 Hours)     Procedure Component Value Units Date/Time    CT Angiogram Chest [[id number]] Collected: 09/14/21 0050     Updated: 09/14/21 0145    Narrative:      EXAM DESCRIPTION:  CT ANGIOGRAM CHEST (accession [id number]), CT ANGIOGRAM  ABDOMEN  PELVIS (accession [id number])  [facility]: CT CHEST ANGIOGRAPHY WITH IV CONTRAST, CT ABDOMEN PELVIS  ANGIOGRAPHY WITHOUT THEN WITH IV CONTRAST     CLINICAL HISTORY:   61 years  Male;  Chest pain radiating to back, rule out aortic  dissection    TECHNOLOGIST NOTES:    COMPARISON: None currently available    TECHNIQUE:     Helical CT performed after bolus intravenous contrast. There are  axial, sagittal and coronal reformatted images available for  review. This exam was performed according to our departmental  dose-optimization program, which includes automated exposure  control, adjustment of the mA and/or kV according to patient size  and/or use of iterative reconstruction technique.  This  examination was performed according to a CT angiographic (CTA)  protocol with 3D post-processing. This involves 3D  reconstructions, MIPS, volume rendered images and/or shaded  surface rendering.  The patient was injected with bolus IV contrast.    FINDINGS:     CTA chest  There is adequate opacification of the pulmonary arteries. No  filling defects to suggest an acute pulmonary embolism. Aorta is  unremarkable. No evidence of significant aneurysmal enlargement  or dissection of the thoracic aorta. There has been prior CABG.    There is no focal area of consolidation.  . No interstitial  disease. No pulmonary fibrosis. No honeycombing. No pneumothorax.  There is no pleural effusion. No pericardial effusion. Calcified  granulomatous disease incidentally noted.    Abdomen:  The liver, spleen, pancreas, adrenal glands and kidneys show no  acute abnormality  . No evidence of acute pancreatitis.    There  are no calcified gallstones. There is no gallbladder wall  thickening. No pericholecystic fluid.  There is no gross biliary  duct dilatation.  No significant hydronephrosis bilaterally.      No free air.    There is no significant free fluid.    There is  no significant lymph node enlargement. There is no  significant  aneurysmal enlargement of the abdominal aorta. No evidence of  dissection.    Pelvis:  There is no evidence of bowel obstruction.    No herniated bowel  loops.  . No focal inflammatory changes . Colonic diverticula but  no evidence of acute diverticulitis.  Evaluation of the bowel is  limited when there is no oral contrast or when the bowel is  incompletely opacified with enteric contrast.. There is no  significant free fluid in the pelvis. Bladder is grossly  unremarkable.          Impression:        1.  Aorta is unremarkable. No evidence of aneurysm or dissection.  2.  No pulmonary embolism. No acute cardiopulmonary disease.  3.  No bowel obstruction. No herniated bowel loops. No focal  inflammatory changes.    Electronically signed by:  Denver Burks MD  9/14/2021 1:44 AM CDT  Workstation: [id number]    CT Angiogram Abdomen Pelvis [[id number]] Collected: 09/14/21 0050     Updated: 09/14/21 0145    Narrative:      EXAM DESCRIPTION:  CT ANGIOGRAM CHEST (accession [id number]), CT ANGIOGRAM ABDOMEN  PELVIS (accession [id number])  [facility]: CT CHEST ANGIOGRAPHY WITH IV CONTRAST, CT ABDOMEN PELVIS  ANGIOGRAPHY WITHOUT THEN WITH IV CONTRAST     CLINICAL HISTORY:   61 years  Male;  Chest pain radiating to back, rule out aortic  dissection    TECHNOLOGIST NOTES:    COMPARISON: None currently available    TECHNIQUE:     Helical CT performed after bolus intravenous contrast. There are  axial, sagittal and coronal reformatted images available for  review. This exam was performed according to our departmental  dose-optimization program, which includes automated exposure  control, adjustment of the mA and/or kV according to patient size  and/or use of iterative reconstruction technique.  This  examination was performed according to a CT angiographic (CTA)  protocol with 3D post-processing. This involves 3D  reconstructions, MIPS, volume rendered images and/or shaded  surface rendering.  The patient was injected with  bolus IV contrast.    FINDINGS:     CTA chest  There is adequate opacification of the pulmonary arteries. No  filling defects to suggest an acute pulmonary embolism. Aorta is  unremarkable. No evidence of significant aneurysmal enlargement  or dissection of the thoracic aorta. There has been prior CABG.    There is no focal area of consolidation.  . No interstitial  disease. No pulmonary fibrosis. No honeycombing. No pneumothorax.  There is no pleural effusion. No pericardial effusion. Calcified  granulomatous disease incidentally noted.    Abdomen:  The liver, spleen, pancreas, adrenal glands and kidneys show no  acute abnormality  . No evidence of acute pancreatitis.    There  are no calcified gallstones. There is no gallbladder wall  thickening. No pericholecystic fluid.  There is no gross biliary  duct dilatation.  No significant hydronephrosis bilaterally.      No free air.    There is no significant free fluid.    There is  no significant lymph node enlargement. There is no significant  aneurysmal enlargement of the abdominal aorta. No evidence of  dissection.    Pelvis:  There is no evidence of bowel obstruction.    No herniated bowel  loops.  . No focal inflammatory changes . Colonic diverticula but  no evidence of acute diverticulitis.  Evaluation of the bowel is  limited when there is no oral contrast or when the bowel is  incompletely opacified with enteric contrast.. There is no  significant free fluid in the pelvis. Bladder is grossly  unremarkable.          Impression:        1.  Aorta is unremarkable. No evidence of aneurysm or dissection.  2.  No pulmonary embolism. No acute cardiopulmonary disease.  3.  No bowel obstruction. No herniated bowel loops. No focal  inflammatory changes.    Electronically signed by:  Denver Burks MD  9/14/2021 1:44 AM CDT  Workstation: [id number]          I have reviewed the patient's current medications.     Assessment/Plan     Active Hospital Problems    Diagnosis    • Chest  pain        Plan:    1.  Chest pain, rule out ACS:  Serial cardiac enzymes were unremarkable.  EKG showed NSR with lateral T-wave inversions.  Stress test was low risk.  Echocardiogram pending.  Patient follows with Dr. Padilla for cardiology.  2.  Vomiting:  Antiemetics.  Gallbladder appeared normal on CTA-chest.    3.  CAD/Hypertension:  Continue home aspirin and losartan.   4.  Diabetes mellitus, type II:  SSI.  Hold Metformin.       Discharge Planning: I expect patient to be discharged to home in 1-2 days.    I confirmed that the patient's Advance Care Plan is present, code status is documented, or surrogate decision maker is listed in the patient's medical record.      I have utilized all available immediate resources to obtain, update, or review the patient's current medications.         This document has been electronically signed by ANUPAM Richmond on September 14, 2021 17:53 CDT

## 2021-09-14 NOTE — ED PROVIDER NOTES
"Subjective   61-year-old white male with a history of CABG in 2016 presents to the emergency department with chief complaint of abnormal EKG. Patient was seen at urgent care and had an abnormal EKG and was referred here. Patient complains of chest pain since last night. The pain radiates to his back. He states \"it feels like a pulled muscle.\" He relates the pain was 5 out of 10 severity and is now 1 out of 10 severity. Associated symptoms include diaphoresis. He denies shortness of breath, nausea, or vomiting. Patient took aspirin at 6 PM.          Review of Systems   Constitutional: Positive for diaphoresis. Negative for chills and fever.   Respiratory: Positive for cough. Negative for shortness of breath.    Cardiovascular: Positive for chest pain. Negative for leg swelling.   Gastrointestinal: Positive for abdominal pain. Negative for nausea and vomiting.   Genitourinary: Negative for dysuria.   Musculoskeletal: Positive for back pain. Negative for neck pain.   Neurological: Negative for syncope, weakness and headaches.   All other systems reviewed and are negative.      History reviewed. No pertinent past medical history.    No Known Allergies    Past Surgical History:   Procedure Laterality Date   • CORONARY ARTERY BYPASS GRAFT      5    • EYE SURGERY         History reviewed. No pertinent family history.    Social History     Socioeconomic History   • Marital status:      Spouse name: Not on file   • Number of children: Not on file   • Years of education: Not on file   • Highest education level: Not on file   Tobacco Use   • Smoking status: Never Smoker   • Smokeless tobacco: Never Used           Objective   Physical Exam  Vitals and nursing note reviewed.   Constitutional:       General: He is not in acute distress.     Appearance: He is not toxic-appearing or diaphoretic.   HENT:      Head: Normocephalic and atraumatic.      Right Ear: External ear normal.      Left Ear: External ear normal.      " Nose: Nose normal.      Mouth/Throat:      Mouth: Mucous membranes are moist.      Pharynx: Oropharynx is clear.   Eyes:      Extraocular Movements: Extraocular movements intact.      Conjunctiva/sclera: Conjunctivae normal.      Pupils: Pupils are equal, round, and reactive to light.   Cardiovascular:      Rate and Rhythm: Normal rate and regular rhythm.      Pulses: Normal pulses.      Heart sounds: Normal heart sounds.   Pulmonary:      Effort: Pulmonary effort is normal.      Breath sounds: Normal breath sounds.   Abdominal:      General: Bowel sounds are normal. There is no distension.      Palpations: Abdomen is soft. There is no mass.      Tenderness: There is no abdominal tenderness. There is no guarding.   Musculoskeletal:      Cervical back: Normal range of motion and neck supple.      Right lower leg: No edema.      Left lower leg: No edema.   Skin:     General: Skin is warm and dry.   Neurological:      General: No focal deficit present.      Mental Status: He is alert and oriented to person, place, and time.   Psychiatric:         Mood and Affect: Mood normal.         Behavior: Behavior normal.         ECG 12 Lead      Date/Time: 9/13/2021 9:02 PM  Performed by: Perez Trevino MD  Authorized by: Perez Trevino MD   Interpreted by physician  Clinical impression: abnormal ECG  Comments: Normal sinus rhythm rate 87. No ST elevation. Inverted T waves anteroseptal leads.                 ED Course  ED Course as of Sep 14 2203   Tue Sep 14, 2021   0210 Patient is alert and resting comfortably in no acute distress.  I reviewed the results of his evaluation with him and recommended admission for observation.  I paged the hospitalist.    [DR]   0216 Case discussed with the hospitalist Dr. Camp.  He agrees to admit the patient to telemetry.    [DR]      ED Course User Index  [DR] Perez Trevino MD            Labs Reviewed   COMPREHENSIVE METABOLIC PANEL - Abnormal; Notable for the following components:        Result Value    Glucose 200 (*)     Sodium 134 (*)     All other components within normal limits    Narrative:     GFR Normal >60  Chronic Kidney Disease <60  Kidney Failure <15     BASIC METABOLIC PANEL - Abnormal; Notable for the following components:    Glucose 152 (*)     Creatinine 0.66 (*)     All other components within normal limits    Narrative:     GFR Normal >60  Chronic Kidney Disease <60  Kidney Failure <15     POCT GLUCOSE FINGERSTICK - Abnormal; Notable for the following components:    Glucose 201 (*)     All other components within normal limits   POCT GLUCOSE FINGERSTICK - Abnormal; Notable for the following components:    Glucose 238 (*)     All other components within normal limits   COVID-19 AND FLU A/B, NP SWAB IN TRANSPORT MEDIA 8-12 HR TAT - Normal    Narrative:     Fact sheet for providers: https://www.fda.gov/media/360877/download    Fact sheet for patients: https://www.fda.gov/media/558204/download    Test performed by PCR.   LIPASE - Normal   CBC WITH AUTO DIFFERENTIAL - Normal   TROPONIN (IN-HOUSE) - Normal    Narrative:     Troponin T Reference Range:  <= 0.03 ng/mL-   Negative for AMI  >0.03 ng/mL-     Abnormal for myocardial necrosis.  Clinicians would have to utilize clinical acumen, EKG, Troponin and serial changes to determine if it is an Acute Myocardial Infarction or myocardial injury due to an underlying chronic condition.       Results may be falsely decreased if patient taking Biotin.     TROPONIN (IN-HOUSE) - Normal    Narrative:     Troponin T Reference Range:  <= 0.03 ng/mL-   Negative for AMI  >0.03 ng/mL-     Abnormal for myocardial necrosis.  Clinicians would have to utilize clinical acumen, EKG, Troponin and serial changes to determine if it is an Acute Myocardial Infarction or myocardial injury due to an underlying chronic condition.       Results may be falsely decreased if patient taking Biotin.     TROPONIN (IN-HOUSE) - Normal    Narrative:     Troponin T Reference  Range:  <= 0.03 ng/mL-   Negative for AMI  >0.03 ng/mL-     Abnormal for myocardial necrosis.  Clinicians would have to utilize clinical acumen, EKG, Troponin and serial changes to determine if it is an Acute Myocardial Infarction or myocardial injury due to an underlying chronic condition.       Results may be falsely decreased if patient taking Biotin.     CBC (NO DIFF) - Normal   POCT GLUCOSE FINGERSTICK   POCT GLUCOSE FINGERSTICK   POCT GLUCOSE FINGERSTICK   POCT GLUCOSE FINGERSTICK   POCT GLUCOSE FINGERSTICK   CBC AND DIFFERENTIAL    Narrative:     The following orders were created for panel order CBC & Differential.  Procedure                               Abnormality         Status                     ---------                               -----------         ------                     CBC Auto Differential[[id number]]        Normal              Final result                 Please view results for these tests on the individual orders.   EXTRA TUBES    Narrative:     The following orders were created for panel order Extra Tubes.  Procedure                               Abnormality         Status                     ---------                               -----------         ------                     Gold Top - SST[[id number]]                                   Final result               Light Blue Top[[id number]]                                   Final result                 Please view results for these tests on the individual orders.   GOLD TOP - SST   LIGHT BLUE TOP     Adult Transthoracic Echo Complete W/ Cont if Necessary Per Protocol    Result Date: 9/14/2021  Narrative: · Estimated right ventricular systolic pressure from tricuspid regurgitation is mildly elevated (35-45 mmHg). · There is mainly affecting the non-coronary and right coronary cusp(s). · The following left ventricular wall segments are hypokinetic: apex hypokinetic. · Left ventricular ejection fraction appears to be 56 - 60%. · Left  ventricular diastolic function was normal.      CT Angiogram Abdomen Pelvis    Result Date: 9/14/2021  Narrative: EXAM DESCRIPTION: CT ANGIOGRAM CHEST (accession [id number]), CT ANGIOGRAM ABDOMEN PELVIS (accession [id number]) RadLex: CT CHEST ANGIOGRAPHY WITH IV CONTRAST, CT ABDOMEN PELVIS ANGIOGRAPHY WITHOUT THEN WITH IV CONTRAST CLINICAL HISTORY: 61 years  Male;  Chest pain radiating to back, rule out aortic dissection TECHNOLOGIST NOTES: COMPARISON: None currently available TECHNIQUE: Helical CT performed after bolus intravenous contrast. There are axial, sagittal and coronal reformatted images available for review. This exam was performed according to our departmental dose-optimization program, which includes automated exposure control, adjustment of the mA and/or kV according to patient size and/or use of iterative reconstruction technique.  This examination was performed according to a CT angiographic (CTA) protocol with 3D post-processing. This involves 3D reconstructions, MIPS, volume rendered images and/or shaded surface rendering. The patient was injected with bolus IV contrast. FINDINGS: CTA chest There is adequate opacification of the pulmonary arteries. No filling defects to suggest an acute pulmonary embolism. Aorta is unremarkable. No evidence of significant aneurysmal enlargement or dissection of the thoracic aorta. There has been prior CABG. There is no focal area of consolidation.  . No interstitial disease. No pulmonary fibrosis. No honeycombing. No pneumothorax. There is no pleural effusion. No pericardial effusion. Calcified granulomatous disease incidentally noted. Abdomen: The liver, spleen, pancreas, adrenal glands and kidneys show no acute abnormality  . No evidence of acute pancreatitis.    There are no calcified gallstones. There is no gallbladder wall thickening. No pericholecystic fluid.  There is no gross biliary duct dilatation.  No significant hydronephrosis bilaterally.   No free  air.    There is no significant free fluid.    There is no significant lymph node enlargement. There is no significant aneurysmal enlargement of the abdominal aorta. No evidence of dissection. Pelvis: There is no evidence of bowel obstruction.    No herniated bowel loops.  . No focal inflammatory changes . Colonic diverticula but no evidence of acute diverticulitis.  Evaluation of the bowel is limited when there is no oral contrast or when the bowel is incompletely opacified with enteric contrast.. There is no significant free fluid in the pelvis. Bladder is grossly unremarkable.     Impression: 1.  Aorta is unremarkable. No evidence of aneurysm or dissection. 2.  No pulmonary embolism. No acute cardiopulmonary disease. 3.  No bowel obstruction. No herniated bowel loops. No focal inflammatory changes. Electronically signed by:  Denver Burks MD  9/14/2021 1:44 AM CDT Workstation: [id number]    Stress Test With Myocardial Perfusion One Day    Result Date: 9/14/2021  Narrative: · Left ventricular ejection fraction is hyperdynamic (Calculated EF > 70%). . · Myocardial perfusion imaging indicates a normal myocardial perfusion study with no evidence of ischemia. · Impressions are consistent with a low risk study. · Moderate risk for ischemic heart disease. · Findings consistent with a normal ECG stress test.      CT Angiogram Chest    Result Date: 9/14/2021  Narrative: EXAM DESCRIPTION: CT ANGIOGRAM CHEST (accession [id number]), CT ANGIOGRAM ABDOMEN PELVIS (accession [id number]) RadLex: CT CHEST ANGIOGRAPHY WITH IV CONTRAST, CT ABDOMEN PELVIS ANGIOGRAPHY WITHOUT THEN WITH IV CONTRAST CLINICAL HISTORY: 61 years  Male;  Chest pain radiating to back, rule out aortic dissection TECHNOLOGIST NOTES: COMPARISON: None currently available TECHNIQUE: Helical CT performed after bolus intravenous contrast. There are axial, sagittal and coronal reformatted images available for review. This exam was performed according to our departmental  dose-optimization program, which includes automated exposure control, adjustment of the mA and/or kV according to patient size and/or use of iterative reconstruction technique.  This examination was performed according to a CT angiographic (CTA) protocol with 3D post-processing. This involves 3D reconstructions, MIPS, volume rendered images and/or shaded surface rendering. The patient was injected with bolus IV contrast. FINDINGS: CTA chest There is adequate opacification of the pulmonary arteries. No filling defects to suggest an acute pulmonary embolism. Aorta is unremarkable. No evidence of significant aneurysmal enlargement or dissection of the thoracic aorta. There has been prior CABG. There is no focal area of consolidation.  . No interstitial disease. No pulmonary fibrosis. No honeycombing. No pneumothorax. There is no pleural effusion. No pericardial effusion. Calcified granulomatous disease incidentally noted. Abdomen: The liver, spleen, pancreas, adrenal glands and kidneys show no acute abnormality  . No evidence of acute pancreatitis.    There are no calcified gallstones. There is no gallbladder wall thickening. No pericholecystic fluid.  There is no gross biliary duct dilatation.  No significant hydronephrosis bilaterally.   No free air.    There is no significant free fluid.    There is no significant lymph node enlargement. There is no significant aneurysmal enlargement of the abdominal aorta. No evidence of dissection. Pelvis: There is no evidence of bowel obstruction.    No herniated bowel loops.  . No focal inflammatory changes . Colonic diverticula but no evidence of acute diverticulitis.  Evaluation of the bowel is limited when there is no oral contrast or when the bowel is incompletely opacified with enteric contrast.. There is no significant free fluid in the pelvis. Bladder is grossly unremarkable.     Impression: 1.  Aorta is unremarkable. No evidence of aneurysm or dissection. 2.  No  pulmonary embolism. No acute cardiopulmonary disease. 3.  No bowel obstruction. No herniated bowel loops. No focal inflammatory changes. Electronically signed by:  Denver Burks MD  9/14/2021 1:44 AM CDT Workstation: [id number]                                  HEART Score (for prediction of 6-week risk of major adverse cardiac event) reviewed and/or performed as part of the patient evaluation and treatment planning process.  The result associated with this review/performance is: 5       MDM    Final diagnoses:   Chest pain, unspecified type   Abnormal EKG       ED Disposition  ED Disposition     ED Disposition Condition Comment    Decision to Admit  Level of Care: Telemetry [5]   Diagnosis: Chest pain, unspecified type [5963038]   Admitting Physician: DEVIKA EATON JR [[id number]]   Attending Physician: DEVIKA EATON JR [[id number]]            No follow-up provider specified.       Medication List      No changes were made to your prescriptions during this visit.          Perez Trevino MD  09/14/21 [id number]

## 2021-09-15 VITALS
HEART RATE: 83 BPM | WEIGHT: 204.8 LBS | TEMPERATURE: 97.1 F | OXYGEN SATURATION: 96 % | BODY MASS INDEX: 29.32 KG/M2 | RESPIRATION RATE: 18 BRPM | DIASTOLIC BLOOD PRESSURE: 77 MMHG | SYSTOLIC BLOOD PRESSURE: 130 MMHG | HEIGHT: 70 IN

## 2021-09-15 LAB
GLUCOSE BLDC GLUCOMTR-MCNC: 166 MG/DL (ref 70–130)
GLUCOSE BLDC GLUCOMTR-MCNC: 266 MG/DL (ref 70–130)

## 2021-09-15 PROCEDURE — G0378 HOSPITAL OBSERVATION PER HR: HCPCS

## 2021-09-15 PROCEDURE — 63710000001 INSULIN ASPART PER 5 UNITS: Performed by: NURSE PRACTITIONER

## 2021-09-15 PROCEDURE — 82962 GLUCOSE BLOOD TEST: CPT

## 2021-09-15 RX ADMIN — INSULIN ASPART 2 UNITS: 100 INJECTION, SOLUTION INTRAVENOUS; SUBCUTANEOUS at 08:19

## 2021-09-15 RX ADMIN — ASPIRIN 81 MG: 81 TABLET, FILM COATED ORAL at 08:19

## 2021-09-15 RX ADMIN — INSULIN ASPART 6 UNITS: 100 INJECTION, SOLUTION INTRAVENOUS; SUBCUTANEOUS at 11:59

## 2021-09-15 RX ADMIN — SODIUM CHLORIDE, PRESERVATIVE FREE 10 ML: 5 INJECTION INTRAVENOUS at 08:19

## 2021-09-15 NOTE — DISCHARGE SUMMARY
[facility] Medicine Services  DISCHARGE SUMMARY       Date of Admission: 9/13/2021  Date of Discharge:  9/15/2021  Primary Care Physician: Renita Valdes APRN    Presenting Problem/History of Present Illness:  Abnormal EKG [R94.31]  Chest pain, unspecified type [R07.9]       Final Discharge Diagnoses:  Active Hospital Problems    Diagnosis    • Chest pain        Consults:   Consults     No orders found from 8/15/2021 to 9/14/2021.        Pertinent Test Results:   Lab Results (last 24 hours)     Procedure Component Value Units Date/Time    POC Glucose Once [[id number]]  (Abnormal) Collected: 09/15/21 1020    Specimen: Blood Updated: 09/15/21 1046     Glucose 266 mg/dL      Comment: RN NotifiedOperator: [id number] DORA Videoflower ID: [id number]       POC Glucose Once [[id number]]  (Abnormal) Collected: 09/15/21 0610    Specimen: Blood Updated: 09/15/21 0627     Glucose 166 mg/dL      Comment: RN NotifiedOperator: [id number] GEORGE ScramblerMailMeter ID: [id number]       POC Glucose Once [[id number]]  (Abnormal) Collected: 09/14/21 1924    Specimen: Blood Updated: 09/14/21 2033     Glucose 238 mg/dL      Comment: RN NotifiedOperator: [id number] GEORGE ScramblerMailMeter ID: [id number]       POC Glucose Once [[id number]]  (Abnormal) Collected: 09/14/21 1638    Specimen: Blood Updated: 09/14/21 1704     Glucose 201 mg/dL      Comment: RN NotifiedOperator: [id number] Kixerer ID: [id number]           Imaging Results (Last 24 Hours)     ** No results found for the last 24 hours. **        Chief Complaint on Day of Discharge: No complaints    Hospital Course:  This is a 61-year-old male with past medical history of CAD (CABG), hypertension and DM2 that presented to [facility] on [facility]/13/2021 with complaints of midsternal chest pain.  Patient states he follows with Dr. Aguayo in [facility] for cardiology.  Serial cardiac enzymes and EKG were  "unremarkable.    Stress test:  · Left ventricular ejection fraction is hyperdynamic (Calculated EF > 70%). .  · Myocardial perfusion imaging indicates a normal myocardial perfusion study with no evidence of ischemia.  · Impressions are consistent with a low risk study.  · Moderate risk for ischemic heart disease.  · Findings consistent with a normal ECG stress test.      Echocardiogram:  · Estimated right ventricular systolic pressure from tricuspid regurgitation is mildly elevated (35-45 mmHg).  · There is mainly affecting the non-coronary and right coronary cusp(s).  · The following left ventricular wall segments are hypokinetic: apex hypokinetic.  · Left ventricular ejection fraction appears to be 56 - 60%.  · Left ventricular diastolic function was normal.    Follow-up with PCP and Dr. Padilla in 1 to 2 weeks.    Condition on Discharge: Stable    Physical Exam on Discharge:  /84 (BP Location: Right arm, Patient Position: Sitting)   Pulse 68   Temp 97.1 °F (36.2 °C) (Temporal)   Resp 18   Ht 177.8 cm (70\")   Wt 92.9 kg (204 lb 12.8 oz)   SpO2 95%   BMI 29.39 kg/m²   Physical Exam  Constitutional:       Appearance: He is well-developed.   HENT:      Head: Normocephalic and atraumatic.   Eyes:      Pupils: Pupils are equal, round, and reactive to light.   Cardiovascular:      Rate and Rhythm: Normal rate and regular rhythm.   Pulmonary:      Effort: Pulmonary effort is normal.      Breath sounds: Normal breath sounds.   Abdominal:      General: Bowel sounds are normal.      Palpations: Abdomen is soft.   Musculoskeletal:         General: Normal range of motion.      Cervical back: Normal range of motion and neck supple.   Skin:     General: Skin is warm and dry.   Neurological:      Mental Status: He is alert and oriented to person, place, and time.   Psychiatric:         Behavior: Behavior normal.       Discharge Disposition:  Home or Self Care    Discharge Medications:     Discharge Medications    "   Continue These Medications      Instructions Start Date   aspirin 81 MG chewable tablet   81 mg, Oral, Daily      atorvastatin 40 MG tablet  Commonly known as: LIPITOR   40 mg, Oral, Daily      cetirizine 10 MG tablet  Commonly known as: zyrTEC   10 mg, Oral, Daily      escitalopram 5 MG tablet  Commonly known as: LEXAPRO   5 mg, Oral, Daily      famotidine 20 MG tablet  Commonly known as: PEPCID   20 mg, Oral, 2 Times Daily      Farxiga 10 MG tablet  Generic drug: Dapagliflozin Propanediol   10 mg, Oral      glipizide 10 MG tablet  Commonly known as: GLUCOTROL   10 mg, Oral, 2 Times Daily Before Meals      losartan 50 MG tablet  Commonly known as: COZAAR   100 mg, Oral, Daily      metFORMIN 1000 MG tablet  Commonly known as: GLUCOPHAGE   1,000 mg, Oral, 2 Times Daily With Meals             Discharge Diet:   Diet Instructions     Diet: Consistent Carbohydrate, Cardiac      Discharge Diet:  Consistent Carbohydrate  Cardiac             Activity at Discharge:   Activity Instructions     Activity as Tolerated            Discharge Care Plan/Instructions: As above    Follow-up Appointment:  Follow-up Information     Kelle Padilla MD. Go on 9/27/2021.    Specialty: Cardiology  Why: Monday September 27th 3:15 pm  Contact information:  [address] [facility] [address]  [phone]             Renita Valdes APRN Follow up on 9/23/2021.    Specialty: Nurse Practitioner  Why: Hospital follow up;Thursday September 23rd @ 9:30 am.Appointment will be a telehealth visit  Contact information:  [address] [facility] [address]  [phone]                   This document has been electronically signed by ANUPAM Richmond on September 15, 2021 10:57 CDT        Time: Greater than 30 minutes.

## 2021-09-15 NOTE — PLAN OF CARE
Goal Outcome Evaluation:           Progress: no change   Pt denies having chest pain today. Vital signs stable. Pt is being discharged today.

## 2021-09-15 NOTE — PROGRESS NOTES
Patient has history of documented atherosclerotic coronary artery disease with previous coronary artery bypass grafting done in 2016.    Patient underwent Lexiscan myocardial perfusion scan.  Patient Lexiscan monitoring.  Was without any symptoms of chest pain or any electrocardiographic changes suggestive of ischemia.    Patient nuclear SPECT image did not reveal of any evidence of any stress-induced ischemia.

## 2021-09-17 LAB
QT INTERVAL: 350 MS
QT INTERVAL: 376 MS
QTC INTERVAL: 451 MS
QTC INTERVAL: 452 MS